# Patient Record
Sex: FEMALE | Race: WHITE | NOT HISPANIC OR LATINO | Employment: FULL TIME | URBAN - METROPOLITAN AREA
[De-identification: names, ages, dates, MRNs, and addresses within clinical notes are randomized per-mention and may not be internally consistent; named-entity substitution may affect disease eponyms.]

---

## 2024-03-20 DIAGNOSIS — S82.452A: Primary | ICD-10-CM

## 2024-03-20 DIAGNOSIS — S82.831A: Primary | ICD-10-CM

## 2024-03-27 ENCOUNTER — OFFICE VISIT (OUTPATIENT)
Dept: ORTHOPEDICS | Facility: CLINIC | Age: 54
End: 2024-03-27
Payer: COMMERCIAL

## 2024-03-27 VITALS — HEIGHT: 66 IN | WEIGHT: 255 LBS | BODY MASS INDEX: 40.98 KG/M2

## 2024-03-27 DIAGNOSIS — S96.911A STRAIN OF RIGHT ANKLE, INITIAL ENCOUNTER: ICD-10-CM

## 2024-03-27 DIAGNOSIS — M25.571 RIGHT ANKLE PAIN, UNSPECIFIED CHRONICITY: Primary | ICD-10-CM

## 2024-03-27 DIAGNOSIS — S82.831A: ICD-10-CM

## 2024-03-27 PROCEDURE — 1159F MED LIST DOCD IN RCRD: CPT | Mod: CPTII,,,

## 2024-03-27 PROCEDURE — 99203 OFFICE O/P NEW LOW 30 MIN: CPT | Mod: ,,,

## 2024-03-27 PROCEDURE — 3008F BODY MASS INDEX DOCD: CPT | Mod: CPTII,,,

## 2024-03-27 PROCEDURE — 1160F RVW MEDS BY RX/DR IN RCRD: CPT | Mod: CPTII,,,

## 2024-03-27 RX ORDER — IVERMECTIN 3 MG/1
TABLET ORAL
COMMUNITY
Start: 2023-12-17

## 2024-03-27 RX ORDER — VALACYCLOVIR HYDROCHLORIDE 1 G/1
2000 TABLET, FILM COATED ORAL 2 TIMES DAILY
COMMUNITY
Start: 2024-01-08

## 2024-03-27 RX ORDER — VORTIOXETINE 10 MG/1
1 TABLET, FILM COATED ORAL
COMMUNITY
Start: 2024-03-10

## 2024-03-27 RX ORDER — DOXYCYCLINE 100 MG/1
CAPSULE ORAL
COMMUNITY
Start: 2023-12-14

## 2024-03-27 RX ORDER — MODAFINIL 200 MG/1
200 TABLET ORAL EVERY MORNING
COMMUNITY
Start: 2023-11-15

## 2024-03-27 RX ORDER — NIFEDIPINE 60 MG/1
60 TABLET, EXTENDED RELEASE ORAL EVERY MORNING
COMMUNITY
Start: 2024-03-10

## 2024-03-27 RX ORDER — SOLRIAMFETOL 75 MG/1
1 TABLET, FILM COATED ORAL EVERY MORNING
COMMUNITY
Start: 2023-12-19

## 2024-03-27 RX ORDER — CARIPRAZINE 1.5 MG/1
1.5 CAPSULE, GELATIN COATED ORAL
COMMUNITY

## 2024-03-27 RX ORDER — DICLOFENAC SODIUM 75 MG/1
75 TABLET, DELAYED RELEASE ORAL
COMMUNITY
Start: 2024-03-19 | End: 2024-04-18

## 2024-03-27 NOTE — PROGRESS NOTES
Subjective:    CC: Injury of the Left Ankle (Right ankle fx. DOI 3/13/24. Pt missed a step coming out of her house. Ankle started swelling 3/19/24. Having pain when her foot isn't in boot and she walks on it. Swelling has gone down. No other concerns with it.)       HPI:  New Patient presents to clinic for evaluation of right ankle.  Patient states that on 03/13/2024 she missed a step causing her to invert her ankle and fall.  She had pain and swelling.  She reported to an urgent care on 03/19/2024 where x-rays revealed a nondisplaced fibula fracture.  She was placed into a boot.  She has been ambulating fully weight-bearing in the boot without difficulty.  She states she only has pain when she attempts to walk without the boot.  Swelling has gone down about the ankle.  She does have chronic swelling for which she wears compression stockings.  She denies any previous ankle or foot surgeries or diagnoses.  Denies numbness or tingling.  Currently taking anti-inflammatories given to her by the urgent care.    ROS: Refer to HPI for pertinent ROS. All other 12 point systems negative.    Objective:    There were no vitals filed for this visit.     Physical Exam:  Right lower extremity compartments are soft and warm. There are no signs or symptoms of DVT or infection. Skin is intact. There is no obvious deformity.  Mild lateral ankle swelling.  Patient has 30 degrees of ankle motion.  Patient is tender to palpation along the lateral malleolus and posterior surrounding soft tissue. Negative squeeze test.  Negative anterior draw; mildly positive lateral tilt. Negative Homans. Neurovascularly intact distally.    Images:  X-rays:  Three views of the right ankle demonstrate a questionable nondisplaced lateral malleolus fracture.  Images Reviewed and discussed with patient.    Assessment:  1. Right ankle pain, unspecified chronicity  - X-Ray Ankle Complete Right; Future    2. Closed avulsion fracture of distal end of right  fibula  - Ambulatory referral/consult to Orthopedics    3. Strain of right ankle, initial encounter       Plan:  Physical exam and imaging findings discussed with patient.  I am not fully convinced that she has a lateral malleolus fracture however given her tenderness to palpation on physical exam we will treat conservatively.  She will continue weight-bearing as tolerated in her boot.  She will continue OTC anti-inflammatories with appropriate precautions as given by the urgent care.  I would like to see the patient back in 2 weeks repeat x-rays.  If no callus seen we have discussed transitioning to ankle brace.  We have discussed elevating, ice and ankle sleeve as needed for swelling.    Follow up: Follow up in about 2 weeks (around 4/10/2024).

## 2024-03-27 NOTE — LETTER
[facility]  [address] [place] [address]  Phone: [phone]  Fax: [phone]    Name:Rebeca Rose  :1970   Date:2024     The above mentioned patient was seen by me on 3/27/24 and is able to return to work/school on 3/28/24.     Please allow patient to breaks as needed throughout the work day.     If you should have any questions, please contact my office at [phone]       Domenico Franklin MD / Coral Matthews PA-C

## 2024-04-10 ENCOUNTER — OFFICE VISIT (OUTPATIENT)
Dept: ORTHOPEDICS | Facility: CLINIC | Age: 54
End: 2024-04-10
Payer: COMMERCIAL

## 2024-04-10 VITALS
HEART RATE: 75 BPM | WEIGHT: 255 LBS | HEIGHT: 66 IN | BODY MASS INDEX: 40.98 KG/M2 | DIASTOLIC BLOOD PRESSURE: 83 MMHG | SYSTOLIC BLOOD PRESSURE: 142 MMHG

## 2024-04-10 DIAGNOSIS — M25.571 RIGHT ANKLE PAIN, UNSPECIFIED CHRONICITY: Primary | ICD-10-CM

## 2024-04-10 DIAGNOSIS — S82.822A CLOSED TORUS FRACTURE OF DISTAL END OF LEFT FIBULA, INITIAL ENCOUNTER: ICD-10-CM

## 2024-04-10 PROCEDURE — 1160F RVW MEDS BY RX/DR IN RCRD: CPT | Mod: CPTII,,, | Performed by: ORTHOPAEDIC SURGERY

## 2024-04-10 PROCEDURE — 1159F MED LIST DOCD IN RCRD: CPT | Mod: CPTII,,, | Performed by: ORTHOPAEDIC SURGERY

## 2024-04-10 PROCEDURE — 99214 OFFICE O/P EST MOD 30 MIN: CPT | Mod: ,,, | Performed by: ORTHOPAEDIC SURGERY

## 2024-04-10 PROCEDURE — 3077F SYST BP >= 140 MM HG: CPT | Mod: CPTII,,, | Performed by: ORTHOPAEDIC SURGERY

## 2024-04-10 PROCEDURE — 3079F DIAST BP 80-89 MM HG: CPT | Mod: CPTII,,, | Performed by: ORTHOPAEDIC SURGERY

## 2024-04-10 PROCEDURE — 3008F BODY MASS INDEX DOCD: CPT | Mod: CPTII,,, | Performed by: ORTHOPAEDIC SURGERY

## 2024-04-11 NOTE — PROGRESS NOTES
"Subjective:    CC: Follow-up of the Left Foot (F/u Lt fibula fx,pt wearing a boot. States not currently in pain.)       HPI:  Patient returns today for repeat exam.  Patient is 3+ weeks from her left distal fibula fracture.  She has been in a boot, she has pain at times.  She denies any new complaints.    ROS: Refer to HPI for pertinent ROS. All other 12 point systems negative.    Objective:  Vitals:    04/10/24 1029   BP: (!) 142/83   BP Location: Right arm   Patient Position: Sitting   BP Method: Medium (Automatic)   Pulse: 75   Weight: 115.7 kg (255 lb)   Height: 5' 6" (1.676 m)        Physical Exam:  Left lower extremity compartment soft and warm.  Skin is intact.  There is no signs symptoms of DVT or infection.  She is point tender along the lateral aspect of the lateral malleolus.  She is nontender medially.  She has 30° of ankle motion otherwise stable to stressing, neurovascular intact distally.    Images:  X-rays three views left ankle demonstrate a nondisplaced distal fibula fracture, below the level of the ankle mortise. Images Reviewed and discussed with patient.    Assessment:  1. Right ankle pain, unspecified chronicity    2. Closed torus fracture of distal end of left fibula, initial encounter        Plan:  At this time we discussed her physical exam and x-ray findings.  She will continue weightbear as tolerated with the boot.  We have discussed weaning her boot over the next 3 weeks.  I would like see back in 3 weeks repeat exam including x-rays of her left ankle    Follow UP: No follow-ups on file.              "

## 2024-05-01 ENCOUNTER — OFFICE VISIT (OUTPATIENT)
Dept: ORTHOPEDICS | Facility: CLINIC | Age: 54
End: 2024-05-01
Payer: COMMERCIAL

## 2024-05-01 DIAGNOSIS — M25.572 LEFT ANKLE PAIN, UNSPECIFIED CHRONICITY: Primary | ICD-10-CM

## 2024-05-01 DIAGNOSIS — S82.822A CLOSED TORUS FRACTURE OF DISTAL END OF LEFT FIBULA, INITIAL ENCOUNTER: ICD-10-CM

## 2024-05-01 PROCEDURE — 99213 OFFICE O/P EST LOW 20 MIN: CPT | Mod: ,,, | Performed by: ORTHOPAEDIC SURGERY

## 2024-05-01 PROCEDURE — 1160F RVW MEDS BY RX/DR IN RCRD: CPT | Mod: CPTII,,, | Performed by: ORTHOPAEDIC SURGERY

## 2024-05-01 PROCEDURE — 1159F MED LIST DOCD IN RCRD: CPT | Mod: CPTII,,, | Performed by: ORTHOPAEDIC SURGERY

## 2024-05-01 NOTE — PROGRESS NOTES
Subjective:    CC: Follow-up of the Left Ankle (F/U for left ankle fx. Ankle is doing ok. Still has pain. Has stiffness until she starts walking. Mild swelling. No other concerns with it.)       HPI:  Patient returns today for repeat exam.  Patient is 5 weeks from her left ankle fracture.  She states she continues to improve though still feels it.  She is currently wearing normal shoes, not taking any pain medications.  Has stiffness in the morning.    ROS: Refer to HPI for pertinent ROS. All other 12 point systems negative.    Objective:  There were no vitals filed for this visit.     Physical Exam:  Left lower extremity compartment soft and warm.  Skin is intact.  There is no signs symptoms of DVT or infection.  She is minimally tender along the base of the heart malleolus.  She is nontender medially.  She has 35° of ankle motion stable to stressing, neurovascular intact distally.    Images:  X-rays three views left ankle demonstrate a healing distal fibula fracture. Images Reviewed and discussed with patient.    Assessment:  1. Left ankle pain, unspecified chronicity  - X-Ray Ankle Complete Left; Future    2. Closed torus fracture of distal end of left fibula, initial encounter        Plan:  At this time we discussed her physical exam and x-ray findings.  She is healing nicely she will continue weightbear as tolerated, like see back in 4 weeks repeat exam including x-rays.    Follow UP: No follow-ups on file.

## 2024-06-05 ENCOUNTER — OFFICE VISIT (OUTPATIENT)
Dept: ORTHOPEDICS | Facility: CLINIC | Age: 54
End: 2024-06-05
Payer: COMMERCIAL

## 2024-06-05 DIAGNOSIS — S82.822A CLOSED TORUS FRACTURE OF DISTAL END OF LEFT FIBULA, INITIAL ENCOUNTER: ICD-10-CM

## 2024-06-05 DIAGNOSIS — M25.572 LEFT ANKLE PAIN, UNSPECIFIED CHRONICITY: Primary | ICD-10-CM

## 2024-06-05 PROCEDURE — 99213 OFFICE O/P EST LOW 20 MIN: CPT | Mod: ,,,

## 2024-06-05 PROCEDURE — 1160F RVW MEDS BY RX/DR IN RCRD: CPT | Mod: CPTII,,,

## 2024-06-05 PROCEDURE — 1159F MED LIST DOCD IN RCRD: CPT | Mod: CPTII,,,

## 2024-06-05 NOTE — PROGRESS NOTES
Subjective:    CC: Follow-up of the Left Ankle (F/U for left ankle. Ankle is doing ok. Pt states it takes awhile for it to get working and feels funny when she walks. No swelling. Able to walk long distances on it. No new concerns with it.)       HPI:  Patient presents to clinic for repeat evaluation of left ankle.  Status post left distal fibula fracture on 03/13/2024.  She is doing much better today.  She has returned to a normal tennis shoe.  She was able to walk 3 miles every day over the past weekend for a conference.  She states no pain.  Not currently taking any pain medication.  She denies any numbness or tingling.  No new complaints.    ROS: Refer to HPI for pertinent ROS. All other 12 point systems negative.    Objective:    There were no vitals filed for this visit.     Physical Exam: Left lower extremity compartment soft and warm.  Skin is intact.  There is no signs symptoms of DVT or infection.  She is nontender to palpation about bony prominences including the lateral malleolus.  She is mild tender to palpation about the surrounding soft tissue of the lateral malleolus.  She is nontender medially.  She has 35° of ankle motion stable to stressing, neurovascular intact distally.     Images:  X-rays three views left ankle demonstrate a well healed distal fibula fracture. Images Reviewed and discussed with patient.        Assessment:  1. Left ankle pain, unspecified chronicity  - X-Ray Ankle Complete Left; Future    2. Closed torus fracture of distal end of left fibula, initial encounter       Plan:  Physical exam and imaging findings discussed with patient.  She is healed her fracture nicely.  She will continue ambulating fully weight-bearing in appropriate shoe wear.  We have discussed intermittent use of OTC anti-inflammatories as well as ice and elevation when needed.  I would like to see the patient back with any problems or difficulties.    Follow up: Follow up if symptoms worsen or fail to  improve.

## 2025-06-02 ENCOUNTER — OFFICE VISIT (OUTPATIENT)
Dept: ORTHOPEDICS | Facility: CLINIC | Age: 55
End: 2025-06-02
Payer: COMMERCIAL

## 2025-06-02 VITALS
TEMPERATURE: 98 F | HEIGHT: 66 IN | SYSTOLIC BLOOD PRESSURE: 124 MMHG | HEART RATE: 87 BPM | WEIGHT: 255.06 LBS | BODY MASS INDEX: 40.99 KG/M2 | DIASTOLIC BLOOD PRESSURE: 77 MMHG

## 2025-06-02 DIAGNOSIS — M25.532 LEFT WRIST PAIN: Primary | ICD-10-CM

## 2025-06-02 DIAGNOSIS — M65.4 TENDINITIS, DE QUERVAIN'S: ICD-10-CM

## 2025-06-02 PROCEDURE — 3074F SYST BP LT 130 MM HG: CPT | Mod: CPTII,,, | Performed by: ORTHOPAEDIC SURGERY

## 2025-06-02 PROCEDURE — 1159F MED LIST DOCD IN RCRD: CPT | Mod: CPTII,,, | Performed by: ORTHOPAEDIC SURGERY

## 2025-06-02 PROCEDURE — 99214 OFFICE O/P EST MOD 30 MIN: CPT | Mod: ,,, | Performed by: ORTHOPAEDIC SURGERY

## 2025-06-02 PROCEDURE — 1160F RVW MEDS BY RX/DR IN RCRD: CPT | Mod: CPTII,,, | Performed by: ORTHOPAEDIC SURGERY

## 2025-06-02 PROCEDURE — 3078F DIAST BP <80 MM HG: CPT | Mod: CPTII,,, | Performed by: ORTHOPAEDIC SURGERY

## 2025-06-02 PROCEDURE — 3008F BODY MASS INDEX DOCD: CPT | Mod: CPTII,,, | Performed by: ORTHOPAEDIC SURGERY

## 2025-06-02 RX ORDER — MELOXICAM 15 MG/1
15 TABLET ORAL DAILY
Qty: 30 TABLET | Refills: 0 | Status: SHIPPED | OUTPATIENT
Start: 2025-06-02